# Patient Record
Sex: FEMALE | Race: WHITE | NOT HISPANIC OR LATINO | Employment: OTHER | URBAN - METROPOLITAN AREA
[De-identification: names, ages, dates, MRNs, and addresses within clinical notes are randomized per-mention and may not be internally consistent; named-entity substitution may affect disease eponyms.]

---

## 2022-12-20 ENCOUNTER — APPOINTMENT (OUTPATIENT)
Dept: RADIOLOGY | Facility: HOSPITAL | Age: 67
End: 2022-12-20
Attending: NURSE PRACTITIONER
Payer: COMMERCIAL

## 2022-12-20 ENCOUNTER — HOSPITAL ENCOUNTER (OUTPATIENT)
Facility: HOSPITAL | Age: 67
Setting detail: OBSERVATION
Discharge: HOME | End: 2022-12-21
Attending: EMERGENCY MEDICINE | Admitting: HOSPITALIST
Payer: COMMERCIAL

## 2022-12-20 ENCOUNTER — APPOINTMENT (OUTPATIENT)
Dept: CT IMAGING | Facility: HOSPITAL | Age: 67
End: 2022-12-20
Attending: EMERGENCY MEDICINE
Payer: COMMERCIAL

## 2022-12-20 DIAGNOSIS — S32.020A CLOSED COMPRESSION FRACTURE OF L2 LUMBAR VERTEBRA, INITIAL ENCOUNTER (H): ICD-10-CM
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
## 2022-12-20 DIAGNOSIS — K59.00 CONSTIPATION, UNSPECIFIED CONSTIPATION TYPE: Primary | ICD-10-CM

## 2022-12-20 PROBLEM — F51.01 PRIMARY INSOMNIA: Status: ACTIVE | Noted: 2021-06-09

## 2022-12-20 PROBLEM — F41.1 GENERALIZED ANXIETY DISORDER: Status: ACTIVE | Noted: 2021-06-09

## 2022-12-20 PROBLEM — M85.80 OSTEOPENIA: Status: ACTIVE | Noted: 2022-05-16

## 2022-12-20 PROBLEM — F33.42 RECURRENT MAJOR DEPRESSIVE DISORDER, IN FULL REMISSION (H): Status: ACTIVE | Noted: 2021-06-09

## 2022-12-20 PROBLEM — I10 ESSENTIAL HYPERTENSION: Status: ACTIVE | Noted: 2021-06-09

## 2022-12-20 PROBLEM — E53.9 VITAMIN B DEFICIENCY: Status: ACTIVE | Noted: 2021-06-09

## 2022-12-20 PROBLEM — E78.5 HYPERLIPIDEMIA: Status: ACTIVE | Noted: 2021-06-09

## 2022-12-20 LAB
ANION GAP SERPL CALCULATED.3IONS-SCNC: 15 MMOL/L (ref 7–15)
BASOPHILS # BLD AUTO: 0.1 10E3/UL (ref 0–0.2)
BASOPHILS NFR BLD AUTO: 1 %
BUN SERPL-MCNC: 10.4 MG/DL (ref 8–23)
CALCIUM SERPL-MCNC: 10.1 MG/DL (ref 8.8–10.2)
CHLORIDE SERPL-SCNC: 96 MMOL/L (ref 98–107)
CREAT SERPL-MCNC: 0.69 MG/DL (ref 0.51–0.95)
DEPRECATED HCO3 PLAS-SCNC: 23 MMOL/L (ref 22–29)
EOSINOPHIL # BLD AUTO: 0.1 10E3/UL (ref 0–0.7)
EOSINOPHIL NFR BLD AUTO: 2 %
ERYTHROCYTE [DISTWIDTH] IN BLOOD BY AUTOMATED COUNT: 12.6 % (ref 10–15)
GFR SERPL CREATININE-BSD FRML MDRD: >90 ML/MIN/1.73M2
GLUCOSE SERPL-MCNC: 90 MG/DL (ref 70–99)
HCT VFR BLD AUTO: 39.5 % (ref 35–47)
HGB BLD-MCNC: 13.4 G/DL (ref 11.7–15.7)
IMM GRANULOCYTES # BLD: 0.1 10E3/UL
IMM GRANULOCYTES NFR BLD: 1 %
LYMPHOCYTES # BLD AUTO: 2.2 10E3/UL (ref 0.8–5.3)
LYMPHOCYTES NFR BLD AUTO: 29 %
MCH RBC QN AUTO: 30.7 PG (ref 26.5–33)
MCHC RBC AUTO-ENTMCNC: 33.9 G/DL (ref 31.5–36.5)
MCV RBC AUTO: 91 FL (ref 78–100)
MONOCYTES # BLD AUTO: 0.6 10E3/UL (ref 0–1.3)
MONOCYTES NFR BLD AUTO: 8 %
NEUTROPHILS # BLD AUTO: 4.7 10E3/UL (ref 1.6–8.3)
NEUTROPHILS NFR BLD AUTO: 59 %
NRBC # BLD AUTO: 0 10E3/UL
NRBC BLD AUTO-RTO: 0 /100
PLATELET # BLD AUTO: 382 10E3/UL (ref 150–450)
POTASSIUM SERPL-SCNC: 3.8 MMOL/L (ref 3.4–5.3)
RBC # BLD AUTO: 4.36 10E6/UL (ref 3.8–5.2)
SARS-COV-2 RNA RESP QL NAA+PROBE: NEGATIVE
SODIUM SERPL-SCNC: 134 MMOL/L (ref 136–145)
WBC # BLD AUTO: 7.7 10E3/UL (ref 4–11)

## 2022-12-20 PROCEDURE — 250N000013 HC RX MED GY IP 250 OP 250 PS 637: Performed by: HOSPITALIST

## 2022-12-20 PROCEDURE — 99285 EMERGENCY DEPT VISIT HI MDM: CPT | Mod: 25

## 2022-12-20 PROCEDURE — 250N000013 HC RX MED GY IP 250 OP 250 PS 637

## 2022-12-20 PROCEDURE — 72131 CT LUMBAR SPINE W/O DYE: CPT

## 2022-12-20 PROCEDURE — 72100 X-RAY EXAM L-S SPINE 2/3 VWS: CPT

## 2022-12-20 PROCEDURE — 99219 PR INITIAL OBSERVATION CARE,LEVEL II: CPT | Performed by: HOSPITALIST

## 2022-12-20 PROCEDURE — 36415 COLL VENOUS BLD VENIPUNCTURE: CPT | Performed by: EMERGENCY MEDICINE

## 2022-12-20 PROCEDURE — U0003 INFECTIOUS AGENT DETECTION BY NUCLEIC ACID (DNA OR RNA); SEVERE ACUTE RESPIRATORY SYNDROME CORONAVIRUS 2 (SARS-COV-2) (CORONAVIRUS DISEASE [COVID-19]), AMPLIFIED PROBE TECHNIQUE, MAKING USE OF HIGH THROUGHPUT TECHNOLOGIES AS DESCRIBED BY CMS-2020-01-R: HCPCS | Performed by: EMERGENCY MEDICINE

## 2022-12-20 PROCEDURE — C9803 HOPD COVID-19 SPEC COLLECT: HCPCS

## 2022-12-20 PROCEDURE — 250N000011 HC RX IP 250 OP 636: Performed by: EMERGENCY MEDICINE

## 2022-12-20 PROCEDURE — 96375 TX/PRO/DX INJ NEW DRUG ADDON: CPT

## 2022-12-20 PROCEDURE — 85025 COMPLETE CBC W/AUTO DIFF WBC: CPT | Performed by: EMERGENCY MEDICINE

## 2022-12-20 PROCEDURE — 72192 CT PELVIS W/O DYE: CPT

## 2022-12-20 PROCEDURE — 96374 THER/PROPH/DIAG INJ IV PUSH: CPT

## 2022-12-20 PROCEDURE — G0378 HOSPITAL OBSERVATION PER HR: HCPCS

## 2022-12-20 PROCEDURE — 96376 TX/PRO/DX INJ SAME DRUG ADON: CPT

## 2022-12-20 PROCEDURE — 250N000013 HC RX MED GY IP 250 OP 250 PS 637: Performed by: INTERNAL MEDICINE

## 2022-12-20 PROCEDURE — 258N000003 HC RX IP 258 OP 636: Performed by: EMERGENCY MEDICINE

## 2022-12-20 PROCEDURE — 250N000011 HC RX IP 250 OP 636: Performed by: INTERNAL MEDICINE

## 2022-12-20 PROCEDURE — 82310 ASSAY OF CALCIUM: CPT | Performed by: EMERGENCY MEDICINE

## 2022-12-20 PROCEDURE — 250N000013 HC RX MED GY IP 250 OP 250 PS 637: Performed by: EMERGENCY MEDICINE

## 2022-12-20 PROCEDURE — 96361 HYDRATE IV INFUSION ADD-ON: CPT

## 2022-12-20 PROCEDURE — 258N000003 HC RX IP 258 OP 636: Performed by: INTERNAL MEDICINE

## 2022-12-20 RX ORDER — VENLAFAXINE HYDROCHLORIDE 150 MG/1
150 CAPSULE, EXTENDED RELEASE ORAL DAILY
Status: DISCONTINUED | OUTPATIENT
Start: 2022-12-20 | End: 2022-12-21 | Stop reason: HOSPADM

## 2022-12-20 RX ORDER — LANOLIN ALCOHOL/MO/W.PET/CERES
3 CREAM (GRAM) TOPICAL
Status: DISCONTINUED | OUTPATIENT
Start: 2022-12-20 | End: 2022-12-21 | Stop reason: HOSPADM

## 2022-12-20 RX ORDER — CHOLECALCIFEROL (VITAMIN D3) 125 MCG
10000 CAPSULE ORAL DAILY
COMMUNITY

## 2022-12-20 RX ORDER — NALOXONE HYDROCHLORIDE 0.4 MG/ML
0.2 INJECTION, SOLUTION INTRAMUSCULAR; INTRAVENOUS; SUBCUTANEOUS
Status: DISCONTINUED | OUTPATIENT
Start: 2022-12-20 | End: 2022-12-21 | Stop reason: HOSPADM

## 2022-12-20 RX ORDER — HYDROMORPHONE HYDROCHLORIDE 1 MG/ML
0.5 INJECTION, SOLUTION INTRAMUSCULAR; INTRAVENOUS; SUBCUTANEOUS EVERY 4 HOURS PRN
Status: DISCONTINUED | OUTPATIENT
Start: 2022-12-20 | End: 2022-12-21

## 2022-12-20 RX ORDER — LORAZEPAM 2 MG/ML
1 INJECTION INTRAMUSCULAR ONCE
Status: COMPLETED | OUTPATIENT
Start: 2022-12-20 | End: 2022-12-20

## 2022-12-20 RX ORDER — LIDOCAINE 4 G/G
1 PATCH TOPICAL
Status: DISCONTINUED | OUTPATIENT
Start: 2022-12-20 | End: 2022-12-21

## 2022-12-20 RX ORDER — MULTIVITAMIN,THERAPEUTIC
1 TABLET ORAL DAILY
COMMUNITY

## 2022-12-20 RX ORDER — VENLAFAXINE HYDROCHLORIDE 150 MG/1
150 TABLET, EXTENDED RELEASE ORAL DAILY
COMMUNITY

## 2022-12-20 RX ORDER — ONDANSETRON 2 MG/ML
4 INJECTION INTRAMUSCULAR; INTRAVENOUS EVERY 6 HOURS PRN
Status: DISCONTINUED | OUTPATIENT
Start: 2022-12-20 | End: 2022-12-21 | Stop reason: HOSPADM

## 2022-12-20 RX ORDER — ONDANSETRON 2 MG/ML
4 INJECTION INTRAMUSCULAR; INTRAVENOUS EVERY 30 MIN PRN
Status: DISCONTINUED | OUTPATIENT
Start: 2022-12-20 | End: 2022-12-20

## 2022-12-20 RX ORDER — HYDROXYZINE HYDROCHLORIDE 25 MG/1
25 TABLET, FILM COATED ORAL
Status: COMPLETED | OUTPATIENT
Start: 2022-12-20 | End: 2022-12-20

## 2022-12-20 RX ORDER — SODIUM CHLORIDE 9 MG/ML
INJECTION, SOLUTION INTRAVENOUS CONTINUOUS
Status: ACTIVE | OUTPATIENT
Start: 2022-12-20 | End: 2022-12-20

## 2022-12-20 RX ORDER — OXYCODONE HYDROCHLORIDE 5 MG/1
5 TABLET ORAL EVERY 4 HOURS PRN
Status: DISCONTINUED | OUTPATIENT
Start: 2022-12-20 | End: 2022-12-21 | Stop reason: HOSPADM

## 2022-12-20 RX ORDER — HYDROMORPHONE HYDROCHLORIDE 1 MG/ML
0.5 INJECTION, SOLUTION INTRAMUSCULAR; INTRAVENOUS; SUBCUTANEOUS
Status: DISCONTINUED | OUTPATIENT
Start: 2022-12-20 | End: 2022-12-20

## 2022-12-20 RX ORDER — AMOXICILLIN 250 MG
1 CAPSULE ORAL 2 TIMES DAILY PRN
Status: DISCONTINUED | OUTPATIENT
Start: 2022-12-20 | End: 2022-12-21 | Stop reason: HOSPADM

## 2022-12-20 RX ORDER — VITAMIN B COMPLEX
1000 TABLET ORAL DAILY
Status: DISCONTINUED | OUTPATIENT
Start: 2022-12-20 | End: 2022-12-21 | Stop reason: HOSPADM

## 2022-12-20 RX ORDER — ONDANSETRON 4 MG/1
4 TABLET, ORALLY DISINTEGRATING ORAL EVERY 6 HOURS PRN
Status: DISCONTINUED | OUTPATIENT
Start: 2022-12-20 | End: 2022-12-21 | Stop reason: HOSPADM

## 2022-12-20 RX ORDER — HYDROCHLOROTHIAZIDE 12.5 MG/1
12.5 CAPSULE ORAL DAILY
Status: ON HOLD | COMMUNITY
End: 2022-12-21

## 2022-12-20 RX ORDER — TRAZODONE HYDROCHLORIDE 50 MG/1
50-100 TABLET, FILM COATED ORAL
COMMUNITY

## 2022-12-20 RX ORDER — METHOCARBAMOL 500 MG/1
500 TABLET, FILM COATED ORAL EVERY 6 HOURS PRN
Status: DISCONTINUED | OUTPATIENT
Start: 2022-12-20 | End: 2022-12-21

## 2022-12-20 RX ORDER — KETOROLAC TROMETHAMINE 15 MG/ML
15 INJECTION, SOLUTION INTRAMUSCULAR; INTRAVENOUS ONCE
Status: COMPLETED | OUTPATIENT
Start: 2022-12-20 | End: 2022-12-20

## 2022-12-20 RX ORDER — ACETAMINOPHEN 325 MG/1
975 TABLET ORAL 3 TIMES DAILY
Status: DISCONTINUED | OUTPATIENT
Start: 2022-12-20 | End: 2022-12-21 | Stop reason: HOSPADM

## 2022-12-20 RX ORDER — FENTANYL CITRATE 50 UG/ML
50 INJECTION, SOLUTION INTRAMUSCULAR; INTRAVENOUS ONCE
Status: COMPLETED | OUTPATIENT
Start: 2022-12-20 | End: 2022-12-20

## 2022-12-20 RX ORDER — AMOXICILLIN 250 MG
2 CAPSULE ORAL 2 TIMES DAILY PRN
Status: DISCONTINUED | OUTPATIENT
Start: 2022-12-20 | End: 2022-12-21 | Stop reason: HOSPADM

## 2022-12-20 RX ORDER — METHOCARBAMOL 500 MG/1
500 TABLET, FILM COATED ORAL ONCE
Status: COMPLETED | OUTPATIENT
Start: 2022-12-20 | End: 2022-12-20

## 2022-12-20 RX ORDER — NALOXONE HYDROCHLORIDE 0.4 MG/ML
0.4 INJECTION, SOLUTION INTRAMUSCULAR; INTRAVENOUS; SUBCUTANEOUS
Status: DISCONTINUED | OUTPATIENT
Start: 2022-12-20 | End: 2022-12-21 | Stop reason: HOSPADM

## 2022-12-20 RX ORDER — HYDROMORPHONE HYDROCHLORIDE 1 MG/ML
0.5 INJECTION, SOLUTION INTRAMUSCULAR; INTRAVENOUS; SUBCUTANEOUS
Status: COMPLETED | OUTPATIENT
Start: 2022-12-20 | End: 2022-12-20

## 2022-12-20 RX ORDER — CHOLECALCIFEROL (VITAMIN D3) 125 MCG
10000 CAPSULE ORAL DAILY
Status: DISCONTINUED | OUTPATIENT
Start: 2022-12-20 | End: 2022-12-21 | Stop reason: HOSPADM

## 2022-12-20 RX ORDER — ACETAMINOPHEN 325 MG/1
325 TABLET ORAL EVERY 8 HOURS PRN
Status: DISCONTINUED | OUTPATIENT
Start: 2022-12-20 | End: 2022-12-20

## 2022-12-20 RX ORDER — CHLORAL HYDRATE 500 MG
1 CAPSULE ORAL DAILY
COMMUNITY

## 2022-12-20 RX ADMIN — OXYCODONE HYDROCHLORIDE 5 MG: 5 TABLET ORAL at 05:59

## 2022-12-20 RX ADMIN — VENLAFAXINE HYDROCHLORIDE 150 MG: 150 CAPSULE, EXTENDED RELEASE ORAL at 10:52

## 2022-12-20 RX ADMIN — ONDANSETRON 4 MG: 2 INJECTION INTRAMUSCULAR; INTRAVENOUS at 01:27

## 2022-12-20 RX ADMIN — HYDROMORPHONE HYDROCHLORIDE 0.5 MG: 1 INJECTION, SOLUTION INTRAMUSCULAR; INTRAVENOUS; SUBCUTANEOUS at 10:05

## 2022-12-20 RX ADMIN — SODIUM CHLORIDE 1000 ML: 9 INJECTION, SOLUTION INTRAVENOUS at 01:31

## 2022-12-20 RX ADMIN — Medication 10000 UNITS: at 10:52

## 2022-12-20 RX ADMIN — LIDOCAINE PATCH 4% 1 PATCH: 40 PATCH TOPICAL at 04:35

## 2022-12-20 RX ADMIN — Medication 1000 UNITS: at 10:06

## 2022-12-20 RX ADMIN — OXYCODONE HYDROCHLORIDE 5 MG: 5 TABLET ORAL at 13:30

## 2022-12-20 RX ADMIN — METHOCARBAMOL 500 MG: 500 TABLET, FILM COATED ORAL at 16:25

## 2022-12-20 RX ADMIN — METHOCARBAMOL 500 MG: 500 TABLET, FILM COATED ORAL at 09:02

## 2022-12-20 RX ADMIN — OXYCODONE HYDROCHLORIDE 5 MG: 5 TABLET ORAL at 19:11

## 2022-12-20 RX ADMIN — METHOCARBAMOL 500 MG: 500 TABLET, FILM COATED ORAL at 22:42

## 2022-12-20 RX ADMIN — ACETAMINOPHEN 975 MG: 325 TABLET ORAL at 09:02

## 2022-12-20 RX ADMIN — HYDROXYZINE HYDROCHLORIDE 25 MG: 25 TABLET, FILM COATED ORAL at 22:42

## 2022-12-20 RX ADMIN — ACETAMINOPHEN 975 MG: 325 TABLET ORAL at 13:30

## 2022-12-20 RX ADMIN — HYDROMORPHONE HYDROCHLORIDE 0.5 MG: 1 INJECTION, SOLUTION INTRAMUSCULAR; INTRAVENOUS; SUBCUTANEOUS at 01:29

## 2022-12-20 RX ADMIN — ACETAMINOPHEN 975 MG: 325 TABLET ORAL at 19:11

## 2022-12-20 RX ADMIN — HYDROMORPHONE HYDROCHLORIDE 0.5 MG: 1 INJECTION, SOLUTION INTRAMUSCULAR; INTRAVENOUS; SUBCUTANEOUS at 14:20

## 2022-12-20 RX ADMIN — KETOROLAC TROMETHAMINE 15 MG: 15 INJECTION, SOLUTION INTRAMUSCULAR; INTRAVENOUS at 01:40

## 2022-12-20 RX ADMIN — METHOCARBAMOL 500 MG: 500 TABLET, FILM COATED ORAL at 04:31

## 2022-12-20 RX ADMIN — LIDOCAINE PATCH 4% 1 PATCH: 40 PATCH TOPICAL at 19:11

## 2022-12-20 RX ADMIN — SODIUM CHLORIDE: 9 INJECTION, SOLUTION INTRAVENOUS at 10:05

## 2022-12-20 RX ADMIN — HYDROMORPHONE HYDROCHLORIDE 0.5 MG: 1 INJECTION, SOLUTION INTRAMUSCULAR; INTRAVENOUS; SUBCUTANEOUS at 20:30

## 2022-12-20 RX ADMIN — FENTANYL CITRATE 50 MCG: 50 INJECTION INTRAMUSCULAR; INTRAVENOUS at 02:14

## 2022-12-20 RX ADMIN — LORAZEPAM 1 MG: 2 INJECTION INTRAMUSCULAR; INTRAVENOUS at 01:40

## 2022-12-20 ASSESSMENT — ACTIVITIES OF DAILY LIVING (ADL)
ADLS_ACUITY_SCORE: 36
ADLS_ACUITY_SCORE: 35
ADLS_ACUITY_SCORE: 36
ADLS_ACUITY_SCORE: 35
ADLS_ACUITY_SCORE: 36
ADLS_ACUITY_SCORE: 35
DEPENDENT_IADLS:: INDEPENDENT
ADLS_ACUITY_SCORE: 35
ADLS_ACUITY_SCORE: 36

## 2022-12-20 ASSESSMENT — ENCOUNTER SYMPTOMS
BACK PAIN: 1
NUMBNESS: 0

## 2022-12-20 NOTE — ED PROVIDER NOTES
NAME: Saba Armstrong  AGE: 67 year old female  YOB: 1955  MRN: [id number]  EVALUATION DATE & TIME: 2022 12:39 AM    PCP: Corine Parsons    ED PROVIDER: Easton Gregory M.D.      Chief Complaint   Patient presents with     Fall     Back Pain     FINAL IMPRESSION:  1. Fall, initial encounter    2. Closed compression fracture of L2 lumbar vertebra, initial encounter (H)        MEDICAL DECISION MAKIN:06 AM I met with the patient, obtained history, performed an initial exam, and discussed options and plan for diagnostics and treatment here in the ED.   3:12 AM Results were communicated with the patient. Discussed observation plans. She was agreeable with the plan.   3:39 AM Care discussed with Dr. Cummings, hospitalist who accepts the patient for admission.    4:01 AM Spoke with Kiera Palma PA-C neurosurgery. They recommend standing x-rays to evaluate any further height loss.  MRI unlikely needed at this time as patient has not been incontinent since initial loss with impact. they will follow up with her in the morning.      Patient was clinically assessed and consented to treatment. After assessment, medical decision making and workup were discussed with the patient. The patient was agreeable to plan for testing, workup, and treatment.  Pertinent Labs & Imaging studies reviewed. (See chart for details)       Medical Decision Making    History:    Supplemental history from: Documented in HPI, if applicable    External Record(s) reviewed: Documented in HPI, if applicable.    Work Up:    Chart documentation includes differential considered and any EKGs or imaging interpreted by provider.    In additional to work up documented, I considered the following work up: See chart documentation, if applicable.    External consultation:    Discussion of management with another provider: See chart documentation, if applicable    Complicating factors:    Care impacted by chronic illness: Hyperlipidemia  and Hypertension    Care affected by social determinants of health: N/A    Disposition considerations: Admit.    Saba Armstrong is a 67 year old female who presents with fall and back pain.   Differential diagnosis includes but not limited to pelvic fracture, lumbar compression fracture, femoral neck fracture, paraspinal muscle spasm, lumbar radiculopathy.  Patient with low back pain and muscle spasms after fall onto her back.  Patient did not lose consciousness nor hit her head and she is not on blood thinners.  Patient appropriate and mainly having left lower back pain but is severe spasms of that left sacral and lumbar paraspinal muscles.  Patient presenting for lumbar compression fracture given the spasms.  Patient had IV placed and labs were sent which were unremarkable.  Patient requiring multiple medications to get spasms and pain under control.  Dilaudid was only minimally effective and followed by Ativan and Toradol did help more with the spasms but pain still quite severe contributing to more spasms.  Small dose of fentanyl was given and this was able to proceed with CT and she was able to tolerate laying flat.  CT scan was obtained that showed no pelvic fracture but did show an L2 compression fracture with 25% height loss anteriorly.  I informed patient of the fracture and we discussed plan of care.  Patient is visiting from 4 hours away and given her pain will likely require admission for pain control.  Patient I discussed this plan and she was in agreement.  I did page neurosurgery and discussed with him the small amount of urine when she fell initially though she has not been incontinent or had any saddle paresthesias since that initial impact and on discussion of CT with Kiera from neurosurgery unlikely cauda equina.  They would recommend upright x-rays to determine height loss and possible brace for support or possibly just for comfort.  After this discussion patient was discussed with hospitalist  for admission and continued pain control.    0 minutes of critical care time    MEDICATIONS GIVEN IN THE EMERGENCY:  Medications   Lidocaine (LIDOCARE) 4 % Patch 1 patch (1 patch Transdermal Patch/Med Applied 12/20/22 0435)     And   lidocaine patch in PLACE ( Transdermal Patch in Place 12/20/22 0443)   HYDROmorphone (PF) (DILAUDID) injection 0.5 mg (has no administration in time range)   HYDROmorphone (DILAUDID) injection 1 mg (has no administration in time range)   oxyCODONE (ROXICODONE) tablet 5 mg (has no administration in time range)   acetaminophen (TYLENOL) tablet 975 mg (has no administration in time range)   acetaminophen (TYLENOL) tablet 325 mg (has no administration in time range)   methocarbamol (ROBAXIN) tablet 500 mg (has no administration in time range)   naloxone (NARCAN) injection 0.2 mg (has no administration in time range)     Or   naloxone (NARCAN) injection 0.4 mg (has no administration in time range)     Or   naloxone (NARCAN) injection 0.2 mg (has no administration in time range)     Or   naloxone (NARCAN) injection 0.4 mg (has no administration in time range)   melatonin tablet 3 mg (has no administration in time range)   ondansetron (ZOFRAN ODT) ODT tab 4 mg (has no administration in time range)     Or   ondansetron (ZOFRAN) injection 4 mg (has no administration in time range)   senna-docusate (SENOKOT-S/PERICOLACE) 8.6-50 MG per tablet 1 tablet (has no administration in time range)     Or   senna-docusate (SENOKOT-S/PERICOLACE) 8.6-50 MG per tablet 2 tablet (has no administration in time range)   0.9% sodium chloride BOLUS (0 mLs Intravenous Stopped 12/20/22 0213)   HYDROmorphone (PF) (DILAUDID) injection 0.5 mg (0.5 mg Intravenous Given 12/20/22 0129)   LORazepam (ATIVAN) injection 1 mg (1 mg Intravenous Given 12/20/22 0140)   ketorolac (TORADOL) injection 15 mg (15 mg Intravenous Given 12/20/22 0140)   fentaNYL (PF) (SUBLIMAZE) injection 50 mcg (50 mcg Intravenous Given 12/20/22 021)  "  methocarbamol (ROBAXIN) tablet 500 mg (500 mg Oral Given 12/20/22 0431)       NEW PRESCRIPTIONS STARTED AT TODAY'S ER VISIT:  New Prescriptions    No medications on file          =================================================================    HPI    Patient information was obtained from: patient and Rn    Use of : N/A        Saba Armstrong is a 67 year old female with a past medical history of hypertension, hyperlipidemia, osteopenia, anxiety, recurrent depressive disorder, who presents a fall and lower back pain.    Patient brought in by EMS after sustaining a mechanical fall at 11pm as she walked out of the elevator and got \"bodychecked\" by another person opening a door from the corner causing her to fall onto her back immediately endorsing lower back pain. She did not sustain LOC. She did urinate on herself during the fall but no loss of bladder incontinence since then. She had to crawl on all on fours before being able to stand up. There is no radiation to her pain. She states she feels the pain spasming. Prior to arrival, she tried taking 2 extra strength Tylenol and applying ice to her lower back with no relief. No numbness or tingling in the groin area. No other injuries were sustained. No other medical complaints at this time.       REVIEW OF SYSTEMS   Review of Systems   Genitourinary:        Positive loss of bladder incontinence (resolved)   Musculoskeletal: Positive for back pain (lower).   Neurological: Negative for numbness.   All other systems reviewed and are negative.       PAST MEDICAL HISTORY:  History reviewed. No pertinent past medical history.    PAST SURGICAL HISTORY:  History reviewed. No pertinent surgical history.    CURRENT MEDICATIONS:      Current Facility-Administered Medications:      acetaminophen (TYLENOL) tablet 325 mg, 325 mg, Oral, Q8H PRN, Subhash Cummings MD     acetaminophen (TYLENOL) tablet 975 mg, 975 mg, Oral, TID, Subhash Cummings MD     HYDROmorphone " "(DILAUDID) injection 1 mg, 1 mg, Intravenous, Q3H PRN, Subhash Cummings MD     HYDROmorphone (PF) (DILAUDID) injection 0.5 mg, 0.5 mg, Intravenous, Q3H PRN, Subhash Cummings MD     Lidocaine (LIDOCARE) 4 % Patch 1 patch, 1 patch, Transdermal, Q24h, 1 patch at 12/20/22 0435 **AND** lidocaine patch in PLACE, , Transdermal, Q8H MICHAEL, Subhash Cummings MD     melatonin tablet 3 mg, 3 mg, Oral, At Bedtime PRN, Subhash Cummings MD     methocarbamol (ROBAXIN) tablet 500 mg, 500 mg, Oral, Q6H PRN, Subhash Cummings MD     naloxone (NARCAN) injection 0.2 mg, 0.2 mg, Intravenous, Q2 Min PRN **OR** naloxone (NARCAN) injection 0.4 mg, 0.4 mg, Intravenous, Q2 Min PRN **OR** naloxone (NARCAN) injection 0.2 mg, 0.2 mg, Intramuscular, Q2 Min PRN **OR** naloxone (NARCAN) injection 0.4 mg, 0.4 mg, Intramuscular, Q2 Min PRN, Subhash Cummings MD     ondansetron (ZOFRAN ODT) ODT tab 4 mg, 4 mg, Oral, Q6H PRN **OR** ondansetron (ZOFRAN) injection 4 mg, 4 mg, Intravenous, Q6H PRN, Subhash Cummings MD     oxyCODONE (ROXICODONE) tablet 5 mg, 5 mg, Oral, Q4H PRN, Subhash Cummings MD     senna-docusate (SENOKOT-S/PERICOLACE) 8.6-50 MG per tablet 1 tablet, 1 tablet, Oral, BID PRN **OR** senna-docusate (SENOKOT-S/PERICOLACE) 8.6-50 MG per tablet 2 tablet, 2 tablet, Oral, BID PRN, Subhash Cummings MD  No current outpatient medications on file.    ALLERGIES:  No Known Allergies    FAMILY HISTORY:  History reviewed. No pertinent family history.    SOCIAL HISTORY:   Social History     Socioeconomic History     Marital status:        PHYSICAL EXAM:    Vitals: /64   Pulse 81   Temp 97.9  F (36.6  C) (Oral)   Resp 18   Ht 1.575 m (5' 2\")   Wt 81.6 kg (180 lb)   SpO2 96%   BMI 32.92 kg/m     Physical Exam  Vitals and nursing note reviewed.   Constitutional:       General: She is not in acute distress.     Appearance: Normal appearance. She is normal weight. She is not ill-appearing or toxic-appearing.   HENT: "      Head: Normocephalic and atraumatic.   Eyes:      Extraocular Movements: Extraocular movements intact.      Pupils: Pupils are equal, round, and reactive to light.   Cardiovascular:      Rate and Rhythm: Normal rate and regular rhythm.      Pulses: Normal pulses.      Heart sounds: Normal heart sounds.      Comments: Bilateral lower extremity dorsal pedis pulses are intact.  Pulmonary:      Effort: Pulmonary effort is normal. No respiratory distress.      Breath sounds: Normal breath sounds.   Abdominal:      General: Abdomen is flat.      Tenderness: There is no abdominal tenderness. There is no right CVA tenderness, left CVA tenderness, guarding or rebound.      Hernia: No hernia is present.   Musculoskeletal:         General: Tenderness present.      Cervical back: Normal range of motion and neck supple. No tenderness.      Lumbar back: Swelling, signs of trauma, spasms, tenderness and bony tenderness present. No deformity. Negative right straight leg raise test and negative left straight leg raise test.        Back:    Skin:     General: Skin is warm and dry.      Capillary Refill: Capillary refill takes less than 2 seconds.      Coloration: Skin is not pale.   Neurological:      General: No focal deficit present.      Mental Status: She is alert and oriented to person, place, and time.      Sensory: No sensory deficit.      Motor: No weakness.      Coordination: Coordination normal.      Deep Tendon Reflexes: Reflexes normal.   Psychiatric:         Behavior: Behavior normal.           LAB:  All pertinent labs reviewed and interpreted.  Labs Ordered and Resulted from Time of ED Arrival to Time of ED Departure   BASIC METABOLIC PANEL - Abnormal       Result Value    Sodium 134 (*)     Potassium 3.8      Chloride 96 (*)     Carbon Dioxide (CO2) 23      Anion Gap 15      Urea Nitrogen 10.4      Creatinine 0.69      Calcium 10.1      Glucose 90      GFR Estimate >90     CBC WITH PLATELETS AND DIFFERENTIAL    WBC  Count 7.7      RBC Count 4.36      Hemoglobin 13.4      Hematocrit 39.5      MCV 91      MCH 30.7      MCHC 33.9      RDW 12.6      Platelet Count 382      % Neutrophils 59      % Lymphocytes 29      % Monocytes 8      % Eosinophils 2      % Basophils 1      % Immature Granulocytes 1      NRBCs per 100 WBC 0      Absolute Neutrophils 4.7      Absolute Lymphocytes 2.2      Absolute Monocytes 0.6      Absolute Eosinophils 0.1      Absolute Basophils 0.1      Absolute Immature Granulocytes 0.1      Absolute NRBCs 0.0     COVID-19 VIRUS (CORONAVIRUS) BY PCR       RADIOLOGY:  CT Lumbar Spine w/o Contrast   Final Result   IMPRESSION:   1.  Acute-appearing anterior compression deformity at L2 resulting in approximately 25% anterior height loss.   2.  Degenerative changes, as described.   3.  Diffuse bony demineralization, suggestive of osteoporosis.      CT Pelvis Bone wo Contrast   Final Result   IMPRESSION:   1.  No visible fracture.          PROCEDURES:   None      I, Mckayla Ramana, am serving as a scribe to document services personally performed by Dr. Easton Gregory  based on my observation and the provider's statements to me. I, Easton Gregory MD attest that Mckayla Boles is acting in a scribe capacity, has observed my performance of the services and has documented them in accordance with my direction.      Easton Gregory M.D.  Emergency Medicine  [facility] Emergency Department     Easton Gregory MD  12/20/22 0454

## 2022-12-20 NOTE — H&P
"[facility]    History and Physical - Hospitalist Service       Date of Admission:  12/20/2022    Assessment & Plan      Saba Armstrong is a 67 year old female admitted on 12/20/2022. She came to the ED for evaluation of back pain after a fall    1.  Acute anterior L2 compression fracture  CT showed 25% anterior height loss, diffuse bony demineralization  Supportive management  Pain control  Neurosurgery consult    2.  Hyponatremia, mild  Encourage oral hydration    3.  Essential hypertension  4.  Hyperlipidemia  5.  Depression, anxiety  Reconcile PTA medications     Diet: Combination Diet Low Saturated Fat Na <2400mg Diet    DVT Prophylaxis: Ambulate every shift  Echeverria Catheter: Not present  Central Lines: None  Cardiac Monitoring: None  Code Status: Full Code      Clinically Significant Risk Factors Present on Admission         # Hyponatremia: Lowest Na = 134 mmol/L in last 2 days, will monitor as appropriate               # Obesity: Estimated body mass index is 32.92 kg/m  as calculated from the following:    Height as of this encounter: 1.575 m (5' 2\").    Weight as of this encounter: 81.6 kg (180 lb).           Disposition Plan      Expected Discharge Date: 12/21/2022                The patient's care was discussed with the Patient.    Subhash Cummings MD  Hospitalist Service  [facility]  Securely message with the [employer] Web Console (learn more here)  Text page via [employer] Paging/Directory         ______________________________________________________________________    Chief Complaint   Back pain    History is obtained from the patient, electronic health record and emergency department physician    History of Present Illness   Saba Armstrong is a 67 year old female who came to the emergency department for evaluation of back pain after a fall.  Past medical history of hypertension, hyperlipidemia, depression, anxiety, insomnia.  Patient is visiting from [place] and " He's pretty stable. returned to the hotel after her grandson's hockey game.  She turned around the corner to get into the elevator when she was hit by a swinging door.  She was pushed against the wall and then fell on her right side.  She denies head trauma or loss of consciousness.  She had an onset of back pain and could not get up on her own.  Patient had episode of urine incontinence, however she states that she needed to go to the bathroom when she walked into the hotel.  She denies saddle anesthesia or radiation of pain down her legs.  She was assisted into her hotel room where she washed up but then could not get out of the bathroom on her own.  She was assisted into bed but then could not get comfortable in any position due to pain.  Patient had some ibuprofen and her daughter called the ambulance.  Patient required treatment with Toradol, lorazepam and fentanyl for better pain control before she could do a CT scan.      Review of Systems    The 10 point Review of Systems is negative other than noted in the HPI or here.     Past Medical History    I have reviewed this patient's medical history and updated it with pertinent information if needed.   History reviewed. No pertinent past medical history.    Past Surgical History   I have reviewed this patient's surgical history and updated it with pertinent information if needed.  History reviewed. No pertinent surgical history.    Social History   I have reviewed this patient's social history and updated it with pertinent information if needed.       Family History     Family history reviewed and noncontributory to current hospitalization    Prior to Admission Medications   None     Allergies   No Known Allergies    Physical Exam   Vital Signs: Temp: 97.9  F (36.6  C) Temp src: Oral BP: 97/55 Pulse: 83   Resp: 20 SpO2: 95 % O2 Device: Nasal cannula Oxygen Delivery: 2 LPM  Weight: 180 lbs 0 oz    Constitutional: awake and alert  Respiratory: no increased work of breathing and good  air exchange  Cardiovascular: regular rate and rhythm and normal S1 and S2  GI: normal bowel sounds and soft  Skin: no bruising or bleeding  Musculoskeletal: no lower extremity pitting edema present  Neurologic: Mental Status Exam:  Level of Alertness:   awake  Orientation:   person, place, time  Motor Exam:  moves all extremities well and symmetrically    Data   Data reviewed today: I reviewed all medications, new labs and imaging results over the last 24 hours. I personally reviewed no images or EKG's today.    Recent Labs   Lab 12/20/22  0127   WBC 7.7   HGB 13.4   MCV 91      *   POTASSIUM 3.8   CHLORIDE 96*   CO2 23   BUN 10.4   CR 0.69   ANIONGAP 15   ESTHER 10.1   GLC 90     Recent Results (from the past 24 hour(s))   CT Pelvis Bone wo Contrast    Narrative    EXAM: CT PELVIS BONE WO CONTRAST  LOCATION: [facility]  DATE/TIME: 12/20/2022 2:42 AM    INDICATION: fall onto sacrum w severe L sided sacral to hip pain.  COMPARISON: None.  TECHNIQUE: CT scan of the pelvis was performed without IV contrast. Multiplanar reformats were obtained. Dose reduction techniques were used.  CONTRAST: None.    FINDINGS:    PELVIS ORGANS: Visualized bowel normal caliber. No free fluid.    MUSCULOSKELETAL: Degenerative change osseous structures.  No visible fracture.      Impression    IMPRESSION:  1.  No visible fracture.   CT Lumbar Spine w/o Contrast    Narrative    EXAM: CT LUMBAR SPINE W/O CONTRAST  LOCATION: [facility]  DATE/TIME: 12/20/2022 2:44 AM    INDICATION: Traumatic injury. Fall onto sacrum with severe left-sided sacral pain radiating to the hip.  COMPARISON: None.  TECHNIQUE: Routine CT Lumbar Spine without IV contrast. Multiplanar reformats. Dose reduction techniques were used.     FINDINGS:  VERTEBRA: Left apex curvature of the lumbar spine. Grade 1 anterolisthesis of L4 on L5 and L5 on S1. Diffuse bony demineralization suggestive of osteoporosis. There  is a mild acute-appearing anterior compression deformity at L2, resulting in 25% anterior   height loss. No osseous retropulsion.    CANAL/FORAMINA: Multilevel degenerative changes of the lumbar spine without high-grade osseous spinal canal stenosis. Bilateral neural foraminal narrowing is greatest on the left at L5-S1 where there is at least moderate stenosis.    PARASPINAL: Mild prevertebral stranding at the level of L2, consistent with mild prevertebral hematoma. Scattered atherosclerotic calcification. See separately dictated pelvis CT.      Impression    IMPRESSION:  1.  Acute-appearing anterior compression deformity at L2 resulting in approximately 25% anterior height loss.  2.  Degenerative changes, as described.  3.  Diffuse bony demineralization, suggestive of osteoporosis.

## 2022-12-20 NOTE — PROGRESS NOTES
Neurosurgery plan of care    Paged by RN for a lumbar XR order for this patient.  Orders placed.  Patient did not show up on our neurosurgery consult list today    Per previous note from our team, could offer brace for pain control if there's an increase in compression of L2 on upright XR. Otherwise follow up in neurosurgery clinic as an outpatient.    Nathalie CARDENAS-C  [facility]  O. [phone]     no

## 2022-12-20 NOTE — PROGRESS NOTES
Late entry:   Called by Dr Gregory at 0400 to review CT lumbar - personally reviewed - reveals mild compression fracture L2 after a fall, no retropulsion, not on blood thinners and neuro intact without deficit. Patient endorses significant pain. Could offer back brace for pain control but not required for structural support. Would like upright XR to see if fracture collapses more and if it does, may require bracing. Please call with questions.     ZULEMA Disla-CNP  [facility] Neurosurgery  O: [phone]

## 2022-12-20 NOTE — CONSULTS
Neurosurgery consult note    Full note to follow-  66yo patient hx osteopenia on no anticoagulation was at a hockey game with her grandson and someone opened a door into her, pinning her back against the wall and she fell to the floor. No head injury or LOC. + bladder incontinence at time of injury, but not since. Patient is c/o severe lower back pain into R gluteal region. She initially went to her hotel room, but pain was bad and she came to [facility] ED for further evaluation. No radiation of pain into the legs, numbness, tingling, weakness. She denies neck or thoracic pain, arm pain, weakness or numbness. She has had no further b/b control issues nor saddle anesthesia.    Exam:  PERRL, EOMs intact, tongue midline, face symmetrical, negative pronator drift or dysmetria  She is full strength throughout both arms and both legs on exam.   Sensation is intact throughout both legs as well  Reflexes throughout lower extremities are 2+  No clonus or babinski  Point tenderness lumbar spine and R paraspinal musculature area.  No bruising    Assessment:  We reviewed her lumbar CT and upright XR. L2 compression fracture is mild. I showed her the pictures.  We would recommend pain control, avoidance of exacerbating activities including bending, twisting, and lifting >5lbs, and outpatient workup and treatment of any osteoporosis by her PCP.  She would like to pursue a lumbar support wrap for comfort while she heals. I will order one. She does not live in this area and I will place a neurosurgery referral for outpatient follow up. Typically we recommend 2-3wk follow up with new lumbar xr to be sure there is no additional compression while she heals. Did briefly mention vertebroplasty/kyphoplasty if fracture were to collapse more within the first 6 wks and she continued to experience severe pain.    Plan:  1. Lumbar support wrap to wear PRN pain  2. Continued pain control  3. Avoidance of lifting >5lbs, bending, twisting,  exacerbating activities  4. F/u with PCP for workup/treatment of osteoporosis  5. Ok to get up, advance activity, discharge from our perspective anytime  6. Would order MRI lumbar spine if developed worsening pain, leg pain, numbness, weakness, or additional bowel or bladder control episodes  6. Follow up with neurosurgery at outside group within 2-3wks with new upright lumbar XR  7. Call us if you dont hear anything and need assistance with coordinating care     Nathalie Cook FNP-C  [facility] Neurosurgery  O. [phone]      NEUROSURGERY CONSULTATION NOTE    Saba Armstrong   [address] [place] [id number]  67 year old female  Admission Date/Time: 12/20/2022 12:39 AM  [facility] Attending Physician: Jimmy Bobo DO    Neurosurgery was asked to see this patient by Jimmy Bobo DO for evaluation of L2 fracture.     PROBLEM LIST:  Principal Problem:    Closed compression fracture of L2 lumbar vertebra, initial encounter (H)       Neurosurgery Attending: The patient's clinical examination, laboratory data, and plan was discussed with Dr Plummer    CONSULTATION ASSESSMENT AND PLAN:    68yo patient hx osteopenia on no anticoagulation who was at a hockey game with her grandson and someone opened a door into her, pinning her back against the wall and she fell to the floor. No head injury or LOC. + bladder incontinence at time of injury, but not since. Patient is c/o severe lower back pain into R gluteal region.    We reviewed her lumbar CT and upright XR. L2 compression fracture is mild. I showed her the pictures. We would recommend pain control, avoidance of exacerbating activities including bending, twisting, and lifting >5lbs, and outpatient workup and treatment of any osteoporosis by her PCP.  She would like to pursue a lumbar support wrap for comfort while she heals. I will order one. She does not live in this area and I will place a  neurosurgery referral for outpatient follow up. Typically we recommend 2-3wk follow up with new lumbar xr to be sure there is no additional compression while she heals. Did briefly mention vertebroplasty/kyphoplasty if fracture were to collapse more within the first 6 wks and she continued to experience severe pain.    Plan:  1. Lumbar support wrap to wear PRN pain  2. Continued pain control  3. Avoidance of lifting >5lbs, bending, twisting, exacerbating activities  4. F/u with PCP for workup/treatment of osteoporosis  5. Ok to get up, advance activity, discharge from our perspective anytime  6. Would order MRI lumbar spine if developed worsening pain, leg pain, numbness, weakness, or additional bowel or bladder control episodes  6. Follow up with neurosurgery at outside group within 2-3wks with new upright lumbar XR  7. Call us if you dont hear anything and need assistance with coordinating care         HPI:    68yo patient hx osteopenia on no anticoagulation was at a hockey game with her grandson and someone opened a door into her, pinning her back against the wall and she fell to the floor. No head injury or LOC. + bladder incontinence at time of injury, but not since. Patient is c/o severe lower back pain into R gluteal region. She initially went to her hotel room, but pain was bad and she came to [facility] ED for further evaluation. No radiation of pain into the legs, numbness, tingling, weakness. She denies neck or thoracic pain, arm pain, weakness or numbness. She has had no further b/b control issues nor saddle anesthesia.    History reviewed. No pertinent past medical history.  History reviewed. No pertinent surgical history.    REVIEW OF SYSTEMS:   negative    MEDICATIONS:  No current outpatient medications on file.         ALLERGIES/SENSITIVITIES:     Allergies   Allergen Reactions     Hmg-Coa-R Inhibitors Other (See Comments)     Elevated liver enzymes     Lisinopril Cough       PERTINENT SOCIAL HISTORY:  "per below  Social History     Socioeconomic History     Marital status:      Spouse name: None     Number of children: None     Years of education: None     Highest education level: None   Tobacco Use     Smoking status: Former     Types: Cigarettes     Quit date: 1976     Years since quittin.0     Smokeless tobacco: Never         FAMILY HISTORY:  History reviewed. No pertinent family history.     PHYSICAL EXAM:   Constitutional: BP (!) 130/92 (BP Location: Right arm)   Pulse 86   Temp 98.2  F (36.8  C) (Oral)   Resp 19   Ht 5' 3\" (1.6 m)   Wt 184 lb 9.6 oz (83.7 kg)   SpO2 98%   BMI 32.70 kg/m       Mental Status: A & O in no acute distress.Speech is fluent.  Recent and remote memory are intact.  Attention span and concentration are normal. Lying in bed. Appears in pain, moving around the bed to try to get comfortable frequently     Cranial Nerves: CN1: grossly intact per patient recall. CN2: No funduscopic exam performed. CN3,4 & 6: Pupillary light response, lateral and vertical gaze normal.  No nystagmus.  Visual fields are full to confrontation. CN5: Intact to touch CN7: No facial weakness, smile, facial symmetry intact. CN8: Intact to spoken voice. CN9&10: Gag reflex, uvula midline, palate rises with phonation. CN11: Shoulder shrug 5/5 intact bilaterally. CN12: Tongue midline and moves freely from side to side.     Motor: No pronator drift of upper extremity. Normal bulk and tone all muscle groups of upper and lower extremities.    She is full strength throughout both arms and both legs on exam.   Sensation is intact throughout both legs as well    Coordination; finger to nose, rapid alternating movements smooth and rhythmic.    Gait deferred     Reflexes: knee/ ankle jerk 2+. No hoffmans/babinski/ clonus.    Point tenderness lumbar spine and R paraspinal musculature area.    IMAGING:  I personally reviewed all radiographic images     EXAM: XR LUMBAR SPINE 2/3 VIEWS  LOCATION: [facility]  DATE/TIME: 12/20/2022 3:56 PM     INDICATION: upright lumbar XR L2 fracture  COMPARISON: Lumbar spine CT 12/20/2022  TECHNIQUE: CR Lumbar Spine.                                                                      IMPRESSION: Mild L2 upper endplate fracture deformity with less than 10% height loss. No progression. Upper to mid lumbar levoconvex curvature of 13 degrees centered at L2-L3 is redemonstrated. Rotatory component. No new fractures. Mild degenerative disc   changes at L2-L3 through L5-S1. Moderate L1-L2 degenerative disc disease.] Mid and lower lumbar facet arthropathy. No pars defects.          (non critical care) I spent more than 45 minutes in this apt, examining the pt, reviewing the scans, reviewing notes from chart, discussing treatment options with risks and benefits and coordinating care. >50 % clinic time was spent in face to face counseling and coordinating care    Nathalie LAKAHNI  [facility] Neurosurgery  O. [phone]

## 2022-12-20 NOTE — ED NOTES
Bed: [facility]  Expected date:   Expected time:   Means of arrival: Ambulance  Comments:  67F back pain after fall, no thinners, no LOC

## 2022-12-20 NOTE — ED TRIAGE NOTES
Patient arrives EMS for fall and lower back pain. Patient was leaving a hotel when the door hit her and caused her to fall and land on her back. Patient was able to stand with the help of bystanders. Treatment prior to arrival included 2 extra strength tylenol and ice back to her lower back.      Triage Assessment     Row Name 12/20/22 0051       Triage Assessment (Adult)    Airway WDL WDL       Respiratory WDL    Respiratory WDL WDL       Skin Circulation/Temperature WDL    Skin Circulation/Temperature WDL WDL       Cardiac WDL    Cardiac WDL WDL       Peripheral/Neurovascular WDL    Peripheral Neurovascular WDL WDL       Cognitive/Neuro/Behavioral WDL    Cognitive/Neuro/Behavioral WDL WDL

## 2022-12-20 NOTE — PHARMACY-ADMISSION MEDICATION HISTORY
Pharmacy Note - Admission Medication History    Pertinent Provider Information: patient may take additional supplements at home but could not remember all names. Prescription medications have been verified per fill history     ______________________________________________________________________    Prior To Admission (PTA) med list completed and updated in EMR.       PTA Med List   Medication Sig Last Dose     cholecalciferol 25 MCG (1000 UT) TABS Take 1,000 Units by mouth daily 12/19/2022 at am     fish oil-omega-3 fatty acids 1000 MG capsule Take 1 g by mouth daily 12/19/2022 at am     hydrochlorothiazide (MICROZIDE) 12.5 MG capsule Take 12.5 mg by mouth daily 12/19/2022 at am     multivitamin, therapeutic (THERA-VIT) TABS tablet Take 1 tablet by mouth daily 12/19/2022 at am     traZODone (DESYREL) 50 MG tablet Take  mg by mouth nightly as needed for sleep Unknown     UNABLE TO FIND Take 1 tablet by mouth daily MEDICATION NAME: thistle seed 12/19/2022 at am     venlafaxine (EFFEXOR-ER) 150 MG 24 hr tablet Take 150 mg by mouth daily 12/19/2022 at am     vitamin A 3 MG (21128 UNITS) capsule Take 10,000 Units by mouth daily 12/19/2022 at am       Information source(s): Patient  Method of interview communication: in-person    Summary of Changes to PTA Med List  New: all entered new  Discontinued: none  Changed: none    Patient was asked about OTC/herbal products specifically.  PTA med list reflects this.    In the past week, patient estimated taking medication this percent of the time:  greater than 90%.    Allergies were reviewed, assessed, and updated with the patient.      Patient does not use any multi-dose medications prior to admission.    The information provided in this note is only as accurate as the sources available at the time of the update(s).    Thank you for the opportunity to participate in the care of this patient.    Jojo Pettit RPH  12/20/2022 9:05 AM

## 2022-12-21 ENCOUNTER — APPOINTMENT (OUTPATIENT)
Dept: OCCUPATIONAL THERAPY | Facility: HOSPITAL | Age: 67
End: 2022-12-21
Attending: INTERNAL MEDICINE
Payer: COMMERCIAL

## 2022-12-21 ENCOUNTER — DOCUMENTATION ONLY (OUTPATIENT)
Dept: ORTHOPEDICS | Facility: CLINIC | Age: 67
End: 2022-12-21

## 2022-12-21 VITALS
SYSTOLIC BLOOD PRESSURE: 109 MMHG | OXYGEN SATURATION: 94 % | HEIGHT: 63 IN | DIASTOLIC BLOOD PRESSURE: 66 MMHG | BODY MASS INDEX: 32.71 KG/M2 | WEIGHT: 184.6 LBS | HEART RATE: 92 BPM | RESPIRATION RATE: 20 BRPM | TEMPERATURE: 98.9 F

## 2022-12-21 LAB
ALBUMIN SERPL BCG-MCNC: 3.8 G/DL (ref 3.5–5.2)
ALP SERPL-CCNC: 132 U/L (ref 35–104)
ALT SERPL W P-5'-P-CCNC: 59 U/L (ref 10–35)
ANION GAP SERPL CALCULATED.3IONS-SCNC: 9 MMOL/L (ref 7–15)
AST SERPL W P-5'-P-CCNC: 58 U/L (ref 10–35)
BILIRUB SERPL-MCNC: 0.9 MG/DL
BUN SERPL-MCNC: 10 MG/DL (ref 8–23)
CALCIUM SERPL-MCNC: 9.1 MG/DL (ref 8.8–10.2)
CHLORIDE SERPL-SCNC: 103 MMOL/L (ref 98–107)
CREAT SERPL-MCNC: 0.66 MG/DL (ref 0.51–0.95)
DEPRECATED HCO3 PLAS-SCNC: 25 MMOL/L (ref 22–29)
ERYTHROCYTE [DISTWIDTH] IN BLOOD BY AUTOMATED COUNT: 12.6 % (ref 10–15)
GFR SERPL CREATININE-BSD FRML MDRD: >90 ML/MIN/1.73M2
GLUCOSE SERPL-MCNC: 129 MG/DL (ref 70–99)
HCT VFR BLD AUTO: 36.9 % (ref 35–47)
HGB BLD-MCNC: 12.4 G/DL (ref 11.7–15.7)
MAGNESIUM SERPL-MCNC: 2 MG/DL (ref 1.7–2.3)
MCH RBC QN AUTO: 31.2 PG (ref 26.5–33)
MCHC RBC AUTO-ENTMCNC: 33.6 G/DL (ref 31.5–36.5)
MCV RBC AUTO: 93 FL (ref 78–100)
PLATELET # BLD AUTO: 327 10E3/UL (ref 150–450)
POTASSIUM SERPL-SCNC: 4 MMOL/L (ref 3.4–5.3)
PROT SERPL-MCNC: 6.4 G/DL (ref 6.4–8.3)
RBC # BLD AUTO: 3.97 10E6/UL (ref 3.8–5.2)
SODIUM SERPL-SCNC: 137 MMOL/L (ref 136–145)
WBC # BLD AUTO: 6.2 10E3/UL (ref 4–11)

## 2022-12-21 PROCEDURE — 250N000013 HC RX MED GY IP 250 OP 250 PS 637: Performed by: HOSPITALIST

## 2022-12-21 PROCEDURE — 99217 PR OBSERVATION CARE DISCHARGE: CPT | Performed by: INTERNAL MEDICINE

## 2022-12-21 PROCEDURE — 96376 TX/PRO/DX INJ SAME DRUG ADON: CPT

## 2022-12-21 PROCEDURE — 250N000009 HC RX 250: Performed by: INTERNAL MEDICINE

## 2022-12-21 PROCEDURE — G0378 HOSPITAL OBSERVATION PER HR: HCPCS

## 2022-12-21 PROCEDURE — 36415 COLL VENOUS BLD VENIPUNCTURE: CPT | Performed by: INTERNAL MEDICINE

## 2022-12-21 PROCEDURE — 97535 SELF CARE MNGMENT TRAINING: CPT | Mod: GO

## 2022-12-21 PROCEDURE — 97165 OT EVAL LOW COMPLEX 30 MIN: CPT | Mod: GO

## 2022-12-21 PROCEDURE — 83735 ASSAY OF MAGNESIUM: CPT | Performed by: INTERNAL MEDICINE

## 2022-12-21 PROCEDURE — 250N000011 HC RX IP 250 OP 636: Performed by: INTERNAL MEDICINE

## 2022-12-21 PROCEDURE — 250N000013 HC RX MED GY IP 250 OP 250 PS 637: Performed by: INTERNAL MEDICINE

## 2022-12-21 PROCEDURE — 85027 COMPLETE CBC AUTOMATED: CPT | Performed by: INTERNAL MEDICINE

## 2022-12-21 PROCEDURE — 80053 COMPREHEN METABOLIC PANEL: CPT | Performed by: INTERNAL MEDICINE

## 2022-12-21 RX ORDER — LIDOCAINE 50 MG/G
OINTMENT TOPICAL 4 TIMES DAILY
Status: DISCONTINUED | OUTPATIENT
Start: 2022-12-21 | End: 2022-12-21 | Stop reason: HOSPADM

## 2022-12-21 RX ORDER — ACETAMINOPHEN 325 MG/1
975 TABLET ORAL 3 TIMES DAILY
Qty: 45 TABLET | Refills: 0 | Status: SHIPPED | OUTPATIENT
Start: 2022-12-21 | End: 2022-12-26

## 2022-12-21 RX ORDER — CALCITONIN SALMON 200 [IU]/.09ML
1 SPRAY, METERED NASAL DAILY
Status: DISCONTINUED | OUTPATIENT
Start: 2022-12-21 | End: 2022-12-21 | Stop reason: HOSPADM

## 2022-12-21 RX ORDER — OXYCODONE HYDROCHLORIDE 5 MG/1
5 TABLET ORAL EVERY 4 HOURS PRN
Qty: 15 TABLET | Refills: 0 | Status: SHIPPED | OUTPATIENT
Start: 2022-12-21

## 2022-12-21 RX ORDER — METHOCARBAMOL 500 MG/1
500 TABLET, FILM COATED ORAL 4 TIMES DAILY
Status: DISCONTINUED | OUTPATIENT
Start: 2022-12-21 | End: 2022-12-21 | Stop reason: HOSPADM

## 2022-12-21 RX ORDER — METHOCARBAMOL 500 MG/1
500 TABLET, FILM COATED ORAL 4 TIMES DAILY PRN
Qty: 20 TABLET | Refills: 0 | Status: SHIPPED | OUTPATIENT
Start: 2022-12-21

## 2022-12-21 RX ORDER — CALCITONIN SALMON 200 [IU]/.09ML
1 SPRAY, METERED NASAL DAILY
Qty: 1.3 ML | Refills: 0 | Status: SHIPPED | OUTPATIENT
Start: 2022-12-21 | End: 2023-01-04

## 2022-12-21 RX ORDER — AMOXICILLIN 250 MG
1 CAPSULE ORAL 2 TIMES DAILY PRN
COMMUNITY
Start: 2022-12-21

## 2022-12-21 RX ORDER — LIDOCAINE 50 MG/G
OINTMENT TOPICAL 4 TIMES DAILY PRN
Qty: 30 G | Refills: 0 | Status: SHIPPED | OUTPATIENT
Start: 2022-12-21

## 2022-12-21 RX ADMIN — METHOCARBAMOL 500 MG: 500 TABLET, FILM COATED ORAL at 13:01

## 2022-12-21 RX ADMIN — OXYCODONE HYDROCHLORIDE 5 MG: 5 TABLET ORAL at 13:01

## 2022-12-21 RX ADMIN — LIDOCAINE: 50 OINTMENT TOPICAL at 13:01

## 2022-12-21 RX ADMIN — LIDOCAINE: 50 OINTMENT TOPICAL at 09:18

## 2022-12-21 RX ADMIN — OXYCODONE HYDROCHLORIDE 5 MG: 5 TABLET ORAL at 04:45

## 2022-12-21 RX ADMIN — VENLAFAXINE HYDROCHLORIDE 150 MG: 150 CAPSULE, EXTENDED RELEASE ORAL at 09:10

## 2022-12-21 RX ADMIN — METHOCARBAMOL 500 MG: 500 TABLET, FILM COATED ORAL at 04:45

## 2022-12-21 RX ADMIN — METHOCARBAMOL 500 MG: 500 TABLET, FILM COATED ORAL at 09:10

## 2022-12-21 RX ADMIN — Medication 10000 UNITS: at 09:10

## 2022-12-21 RX ADMIN — OXYCODONE HYDROCHLORIDE 5 MG: 5 TABLET ORAL at 00:08

## 2022-12-21 RX ADMIN — OXYCODONE HYDROCHLORIDE 5 MG: 5 TABLET ORAL at 09:31

## 2022-12-21 RX ADMIN — ACETAMINOPHEN 975 MG: 325 TABLET ORAL at 13:01

## 2022-12-21 RX ADMIN — HYDROMORPHONE HYDROCHLORIDE 0.5 MG: 1 INJECTION, SOLUTION INTRAMUSCULAR; INTRAVENOUS; SUBCUTANEOUS at 02:56

## 2022-12-21 RX ADMIN — HYDROMORPHONE HYDROCHLORIDE 0.5 MG: 1 INJECTION, SOLUTION INTRAMUSCULAR; INTRAVENOUS; SUBCUTANEOUS at 07:03

## 2022-12-21 RX ADMIN — ACETAMINOPHEN 975 MG: 325 TABLET ORAL at 09:10

## 2022-12-21 RX ADMIN — Medication 1000 UNITS: at 09:10

## 2022-12-21 ASSESSMENT — ACTIVITIES OF DAILY LIVING (ADL)
ADLS_ACUITY_SCORE: 36

## 2022-12-21 NOTE — PROGRESS NOTES
S: Saba Armstrong was seen at the [facility], Room 413-01, for the Evaluation, Fit and Delivery of a Lumbar Corset Style Orthosis.      Dx: Mild L2 upper endplate fracture deformity with less than 10% height loss. No progression. Upper to mid lumbar levoconvex curvature of 13 degrees centered at L2-L3 is redemonstrated. Rotatory component. No new fractures. Mild degenerative disc changes at L2-L3 through L5-S1. Moderate L1-L2 degenerative disc disease.] Mid and lower lumbar facet arthropathy. No pars defects.    O: The patient s waist circumference was measured to be 43 inches.   An X-Large Slater Corset Style Orthosis was selected.  The rigid anterior panel was assembled and set to the patient s midline.  The compression handles were then adjusted to contour the posterior panel of the orthosis to provide a custom fit the orthosis to the patient.      A:  I explained to the patient the purpose and function of the brace as well as donning and doffing.  I also provided the patient with the written use and care instructions from the .    P: If there is a need to adjust the brace while the patient still in the hospital, the patient s nurse should contact the [facility].  Adjustments made outside of the hospital can be performed, at no charge, at any of the [facility].    G: This orthosis applies compression to the patient s soft tissues in the lumbosacral region of the spine.  Compression of the patient s soft tissues unloads the vertebrae, which in turn reduces pain in the spine.  The orthosis also increases medial-lateral and anterior-posterior stability of the back/spine.  The goal of this orthosis is to reduce pain and increase the comfort while the patient performs their ADLs.    Please contact the [facility] at [phone] if you have any questions.  Thank you, Cachorro    Electronically signed by Cachorro  Lopez, , TERRELLO

## 2022-12-21 NOTE — PROGRESS NOTES
Care Management Follow Up    Length of Stay (days): 0    Expected Discharge Date: 12/21/2022     Concerns to be Addressed: no discharge needs identified     Patient plan of care discussed at interdisciplinary rounds: Yes    Anticipated Discharge Disposition: TBD      Anticipated Discharge Services:  TBD  Anticipated Discharge DME:  TBD  Education Provided on the Discharge Plan:  Per Care Team   Patient/Family in Agreement with the Plan:  Yes    Referrals Placed by CM/SW:    Private pay costs discussed: Not applicable    Additional Information:  Chart reviewed.    Lives w/spouse in their home in [place]. Indep at baseline, works full time. No services, no DME. No apparent CM needs. Spouse to transport at discharge.    Therapy consults placed, awaiting recs.       CM will continue to follow plan of care, review recommendations, and assist with any discharge needs anticipated.         Renae Espino RN

## 2022-12-21 NOTE — PLAN OF CARE
"  Problem: Plan of Care - These are the overarching goals to be used throughout the patient stay.    Goal: Absence of Hospital-Acquired Illness or Injury  Intervention: Identify and Manage Fall Risk  Recent Flowsheet Documentation  Taken 12/20/2022 1853 by Ilda Kothari RN  Safety Promotion/Fall Prevention: activity supervised  Intervention: Prevent and Manage VTE (Venous Thromboembolism) Risk  Recent Flowsheet Documentation  Taken 12/20/2022 1853 by Ilda Kothari RN  VTE Prevention/Management: SCDs (sequential compression devices) off  Goal: Optimal Comfort and Wellbeing  Intervention: Monitor Pain and Promote Comfort  Recent Flowsheet Documentation  Taken 12/20/2022 1853 by Ilda Kothari RN  Pain Management Interventions:    medication (see MAR)    cold applied     Problem: Mobility Impairment  Goal: Optimal Mobility  Outcome: Progressing   Goal Outcome Evaluation:  Pt came up from ED reporting 8/10 pain in lumbar spine to hip.  She is having spasms that cause her to cry out every few moments.  Given PRN Oxycodone for pain.  She is able to walk to bathroom and participate in bed mobility.  Instructed on lumbar spine precautions.  No brace until tomorrow.  Pt using O2 PRN stating she \"can't take a deep breath when back spasms\" so writer instructed on breathing technique to promote full breaths.                          "

## 2022-12-21 NOTE — PLAN OF CARE
"  Problem: Plan of Care - These are the overarching goals to be used throughout the patient stay.    Goal: Plan of Care Review  Description: The Plan of Care Review/Shift note should be completed every shift.  The Outcome Evaluation is a brief statement about your assessment that the patient is improving, declining, or no change.  This information will be displayed automatically on your shift note.  Outcome: Adequate for Care Transition  Goal: Patient-Specific Goal (Individualized)  Description: You can add care plan individualizations to a care plan. Examples of Individualization might be:  \"Parent requests to be called daily at 9am for status\", \"I have a hard time hearing out of my right ear\", or \"Do not touch me to wake me up as it startles me\".  Outcome: Adequate for Care Transition  Goal: Absence of Hospital-Acquired Illness or Injury  Outcome: Adequate for Care Transition  Intervention: Identify and Manage Fall Risk  Recent Flowsheet Documentation  Taken 12/21/2022 0900 by Enoc Marsh RN  Safety Promotion/Fall Prevention: bed alarm on  Intervention: Prevent Skin Injury  Recent Flowsheet Documentation  Taken 12/21/2022 0900 by Enoc Marsh RN  Body Position: position changed independently  Intervention: Prevent and Manage VTE (Venous Thromboembolism) Risk  Recent Flowsheet Documentation  Taken 12/21/2022 0900 by Enoc Marsh RN  VTE Prevention/Management: SCDs (sequential compression devices) off  Goal: Optimal Comfort and Wellbeing  Outcome: Adequate for Care Transition  Intervention: Monitor Pain and Promote Comfort  Recent Flowsheet Documentation  Taken 12/21/2022 0900 by Enoc Marsh RN  Pain Management Interventions: medication (see MAR)  Goal: Readiness for Transition of Care  Outcome: Adequate for Care Transition   Goal Outcome Evaluation:    Pt discharge to home accompanied by  at about 1415. VSS, Patient stated pain level of 7/10 and pain management was effective. Discharge " instruction reviewed with patient, discharge medication sent with patient and verbalized understanding of follow up appointment withy neurosurgery, PCP.

## 2022-12-21 NOTE — PLAN OF CARE
Problem: Plan of Care - These are the overarching goals to be used throughout the patient stay.    Goal: Absence of Hospital-Acquired Illness or Injury  Intervention: Identify and Manage Fall Risk  Recent Flowsheet Documentation  Taken 12/20/2022 2200 by Ilda Kothari RN  Safety Promotion/Fall Prevention: activity supervised  Taken 12/20/2022 1853 by Ilda Kothari RN  Safety Promotion/Fall Prevention: activity supervised  Intervention: Prevent and Manage VTE (Venous Thromboembolism) Risk  Recent Flowsheet Documentation  Taken 12/20/2022 2200 by Ilda Kothari RN  VTE Prevention/Management: SCDs (sequential compression devices) off  Taken 12/20/2022 1853 by Ilda Kothari RN  VTE Prevention/Management: SCDs (sequential compression devices) off  Goal: Optimal Comfort and Wellbeing  Intervention: Monitor Pain and Promote Comfort  Recent Flowsheet Documentation  Taken 12/20/2022 2200 by Ilda Kothari RN  Pain Management Interventions:    distraction    cold applied  Taken 12/20/2022 1853 by Ilda Kothari RN  Pain Management Interventions:    medication (see MAR)    cold applied   Goal Outcome Evaluation:  Pt continues to have severe pain despite administration of opiates.  She developed intense itching and a rash after Lidocaine patch was placed on her back.  Lidocaine was discontinued.  Pt has scratched her back due to itching.  She has spasms that cause her to blurt out every few moments.  She has been able to walk to the bathroom.  PRN Atarax ordered for itching.  Ice applied for comfort.  Robaxin PRN for spasms.

## 2022-12-21 NOTE — DISCHARGE SUMMARY
[facility] MEDICINE  DISCHARGE SUMMARY     Primary Care Physician: Corine Parsons  Admission Date: 12/20/2022   Discharge Provider: Jimmy Bobo DO, DO Discharge Date: 12/21/2022   Diet:   Active Diet and Nourishment Order   Procedures     Combination Diet Regular Diet     Discharge Instruction - Regular Diet Adult     Diet       Code Status: Full Code   Activity: per neurosurgery discharge activity instructions        Condition at Discharge: Stable     REASON FOR PRESENTATION(See Admission Note for Details)   Refer to h&p    PRINCIPAL & ACTIVE DISCHARGE DIAGNOSES     Principal Problem:    Closed compression fracture of L2 lumbar vertebra, initial encounter (H)      PENDING LABS     Unresulted Labs Ordered in the Past 30 Days of this Admission     No orders found for last 31 day(s).            PROCEDURES ( this hospitalization only)          RECOMMENDATIONS TO OUTPATIENT PROVIDER FOR F/U VISIT     Follow-up Appointments     Follow Up Care      You have been referred to neurosurgery after discharge. Someone should   call you to coordinate care in your area  We typically recommend follow up within 2-3 wks with new lumbar Xrays to   recheck your fracture.  Please call our office if you do not hear anything    Nathalie CARDENAS-C  [facility] Neurosurgery  O. [phone]         Follow-up and recommended labs and tests       Follow up with primary care provider, Corine Parsons, within 7 days for   hospital follow- up.  No follow up labs or test are needed.                 DISPOSITION     Home    SUMMARY OF HOSPITAL COURSE:      Acute L2 compression fracture: pain controlled.  lumbar brace, calcitonin nasal spray x 14d, pain meds as prescribed.  Outpatient f/u 2 weeks as advised in neurosurgery consultation note.  Refer to neurosurgery consultation note for additional instructions and recommendations.    Discharge Medications with Med changes:     Current Discharge Medication  List      START taking these medications    Details   acetaminophen (TYLENOL) 325 MG tablet Take 3 tablets (975 mg) by mouth 3 times daily for 5 days  Qty: 45 tablet, Refills: 0    Associated Diagnoses: Closed compression fracture of L2 lumbar vertebra, initial encounter (H)      calcitonin, salmon, (MIACALCIN) 200 UNIT/ACT nasal spray Spray 1 spray into one nostril alternating nostrils daily for 14 days Alternate nostril each day.  Qty: 1.3 mL, Refills: 0    Associated Diagnoses: Closed compression fracture of L2 lumbar vertebra, initial encounter (H)      lidocaine (XYLOCAINE) 5 % external ointment Apply topically 4 times daily as needed for moderate pain (4-6)  Qty: 30 g, Refills: 0    Associated Diagnoses: Closed compression fracture of L2 lumbar vertebra, initial encounter (H)      methocarbamol (ROBAXIN) 500 MG tablet Take 1 tablet (500 mg) by mouth 4 times daily as needed for muscle spasms  Qty: 20 tablet, Refills: 0    Associated Diagnoses: Closed compression fracture of L2 lumbar vertebra, initial encounter (H)      oxyCODONE (ROXICODONE) 5 MG tablet Take 1 tablet (5 mg) by mouth every 4 hours as needed for moderate pain (4-6)  Qty: 15 tablet, Refills: 0    Associated Diagnoses: Closed compression fracture of L2 lumbar vertebra, initial encounter (H)      senna-docusate (SENOKOT-S/PERICOLACE) 8.6-50 MG tablet Take 1 tablet by mouth 2 times daily as needed for constipation    Associated Diagnoses: Constipation, unspecified constipation type         CONTINUE these medications which have NOT CHANGED    Details   cholecalciferol 25 MCG (1000 UT) TABS Take 1,000 Units by mouth daily      fish oil-omega-3 fatty acids 1000 MG capsule Take 1 g by mouth daily      multivitamin, therapeutic (THERA-VIT) TABS tablet Take 1 tablet by mouth daily      traZODone (DESYREL) 50 MG tablet Take  mg by mouth nightly as needed for sleep      venlafaxine (EFFEXOR-ER) 150 MG 24 hr tablet Take 150 mg by mouth daily       vitamin A 3 MG (43682 UNITS) capsule Take 10,000 Units by mouth daily         STOP taking these medications       hydrochlorothiazide (MICROZIDE) 12.5 MG capsule Comments:   Reason for Stopping:         UNABLE TO FIND Comments:   Reason for Stopping:                     Rationale for medication changes:              Consults       NEUROSURGERY IP CONSULT  CARE MANAGEMENT / SOCIAL WORK IP CONSULT  ORTHOSIS BRACE IP CONSULT  PHYSICAL THERAPY ADULT IP CONSULT  OCCUPATIONAL THERAPY ADULT IP CONSULT    Immunizations given this encounter     Most Recent Immunizations   Administered Date(s) Administered     COVID-19 Vaccine 12+ (Pfizer 2022) 05/16/2022     COVID-19 Vaccine 18+ (Moderna) 11/28/2021     COVID-19 Vaccine Bivalent Booster 12+ (Pfizer) 12/09/2022     Influenza Vaccine 65+ (Fluzone HD) 12/09/2022           Anticoagulation Information            SIGNIFICANT IMAGING FINDINGS     Results for orders placed or performed during the hospital encounter of 12/20/22   CT Lumbar Spine w/o Contrast    Impression    IMPRESSION:  1.  Acute-appearing anterior compression deformity at L2 resulting in approximately 25% anterior height loss.  2.  Degenerative changes, as described.  3.  Diffuse bony demineralization, suggestive of osteoporosis.   CT Pelvis Bone wo Contrast    Impression    IMPRESSION:  1.  No visible fracture.   XR Lumbar Spine 2/3 Views    Impression    IMPRESSION: Mild L2 upper endplate fracture deformity with less than 10% height loss. No progression. Upper to mid lumbar levoconvex curvature of 13 degrees centered at L2-L3 is redemonstrated. Rotatory component. No new fractures. Mild degenerative disc   changes at L2-L3 through L5-S1. Moderate L1-L2 degenerative disc disease.] Mid and lower lumbar facet arthropathy. No pars defects.       SIGNIFICANT LABORATORY FINDINGS         Discharge Orders        Neurosurgery Referral      When to call - Contact Surgeon Team    Call [phone] with the following  symptoms:    *Worsening pain not relieved by the pain prescription given  *Worsening or new onset of weakness, or numbness and tingling  *Loss or change in your ability to control bowel or bladder function  *Change in your ability to walk, talk, see or think  *If you did not have a bowel movement before leaving the hospital and your bowels have not moved within 48 hours of your discharge    !!!! IF YOU HAVE A SERIOUS OR THREATENING EMERGENCY, CALL 911 OR COME TO THE EMERGENCY ROOM.  IF YOUR CONDITIONS ALLOWS COME TO THE HOSPITAL WHERE YOUR SURGERY WAS PERFORMED.    QUESTIONS OR CONCERNS:   OUR OFFICE HOURS ARE FROM 9:00 A.M -4:30 P.M. MONDAY-FRIDAY.  AFTER OFFICE HOURS, CALLS ARE ANSWERED BY THE ON-CALL PROVIDER. PLEASE CALL WITH ROUTINE QUESTIONS DURING OFFICE HOURS. THE ON-CALL PROVIDER WILL NOT REFILL PRESCRIPTIONS.     Discharge Instruction - Breathing exercises    Perform breathing exercises using your Incentive Spirometer 10 times per hour while awake for 2 weeks.     Symptoms - Fever Management    A low grade fever can be expected after surgery.  Use acetaminophen (TYLENOL) as needed for fever management.  Contact your Surgeon Team if you have a fever greater than 101.5 F, chills, and/or night sweats.     Symptoms - Constipation management    Constipation (hard, dry bowel movements) is expected after surgery due to the combination of being less active, the anesthetic, and the opioid pain medication.  You can do the following to help reduce constipation:  ~  FLUIDS:  Drink clear liquids (water or Gatorade), or juice (apple/prune).  ~  DIET:  Eat a fiber rich diet.    ~  ACTIVITY:  Get up and move around several times a day.  Increase your activity as you are able.  MEDICATIONS:  Reduce the risk of constipation by starting medications before you are constipated.  You can take Miralax   (1 packet as directed) and/or a stool softener (Senokot 1-2 tablets 1-2 times a day).  If you already have constipation and  these medications are not working, you can get magnesium citrate and use as directed.  If you continue to have constipation you can try an over the counter suppository or enema.  Call your Surgeon Team if it has been greater than 3 days since your last bowel movement.     Follow Up Care    You have been referred to neurosurgery after discharge. Someone should call you to coordinate care in your area  We typically recommend follow up within 2-3 wks with new lumbar Xrays to recheck your fracture.  Please call our office if you do not hear anything    Nathalie RIVERAP-C  [facility] Neurosurgery  O. [phone]     Activity    *No lifting, pushing or pulling greater than 5-10 pounds (this is about a gallon of milk).  *No repetitive bending, twisting, or jarring activities  *No overhead work  *No aerobic or strenuous activity  *No activities with increased risk of falls  *You may move about your home as tolerated  *You may walk up and down stairs as tolerated      WALKING PROGRAM: As you can tolerate, walk daily-start with 5-10 minutes of continuous walking. This is in addition to the walking that you do as part of your daily activities. Increase the time that you walk by 5 minutes every couple of days. Do not exceed 30-45 minutes of continuous walking until seen in follow-up.   **Listen to your body, if you find that you are more painful or fatigued, you may need to proceed more slowly.    **Do not smoke or expose yourself to second hand smoke. Cigarette smoke can delay healing and cause complications.     DRIVING:  We recommend that you do not drive while taking medications for pain or muscle spasms. Always read and follow the advice on your prescription bottle. If you have questions, speak with your pharmacist.  We recommend that you do not drive while wearing a brace, as it could limit your range of motion.     When to call - Reasons to Call 911    Call 911 immediately if you experience sudden-onset chest  pain, arm weakness/numbness, slurred speech, or shortness of breath     When to call - Reach out to Urgent Care    If you are not able to reach your Surgeon Team and you need immediate care, go to the Orthopedic Walk-in Clinic or Urgent Care at your Surgeon's office.  Do NOT go to the Emergency Room unless you have shortness of breath, chest pain, or other signs of a medical emergency.     Reason for your hospital stay    Refer to h&p     Follow-up and recommended labs and tests     Follow up with primary care provider, Corine Parsons, within 7 days for hospital follow- up.  No follow up labs or test are needed.     Discharge Instruction - Regular Diet Adult    Return to your pre-surgery diet unless instructed otherwise     Diet    Follow this diet upon discharge: Orders Placed This Encounter      Combination Diet Regular Diet      Discharge Instruction - Regular Diet Adult       Examination   Physical Exam   Temp:  [97.7  F (36.5  C)-98.9  F (37.2  C)] 98.9  F (37.2  C)  Pulse:  [83-98] 92  Resp:  [19-20] 20  BP: (109-140)/(60-92) 109/66  SpO2:  [90 %-98 %] 94 %  Wt Readings from Last 1 Encounters:   12/20/22 83.7 kg (184 lb 9.6 oz)       gen nad  cv rrr  Lungs cta  Neuro intact    Jimmy Bobo DO, DO  [facility]    CC:Corine Parsons

## 2022-12-21 NOTE — PROGRESS NOTES
12/21/22 0915   Appointment Info   Signing Clinician's Name / Credentials (OT) Greer Lloyd OT OTD   Quick Adds   Quick Adds Certification   Living Environment   People in Home spouse;other (see comments)  (adult nephew)   Current Living Arrangements house   Home Accessibility stairs to enter home;stairs within home   Number of Stairs, Main Entrance 2   Living Environment Comments Able to live on main floor if needed, tub/shower with grab bars, may have shower chair, owns 4WW   Self-Care   Equipment Currently Used at Home none   Activity/Exercise/Self-Care Comment ind with all ADLs and IADLs, works full time as  from home   General Information   Onset of Illness/Injury or Date of Surgery 12/20/22   Referring Physician Jimmy Bobo,    Patient/Family Therapy Goal Statement (OT) To decrease pain   Additional Occupational Profile Info/Pertinent History of Current Problem 66yo patient hx osteopenia on no anticoagulation   Existing Precautions/Restrictions brace worn when out of bed;spinal  (LSO corset)   Left Lower Extremity (Weight-bearing Status) weight-bearing as tolerated (WBAT)   Right Lower Extremity (Weight-bearing Status) weight-bearing as tolerated (WBAT)   General Observations and Info Increased pain at start of session, LSO brace provided from orthotics during session, reporting decreased pain   Cognitive Status Examination   Orientation Status orientation to person, place and time   Visual Perception   Visual Impairment/Limitations WFL   Posture   Posture not impaired   Range of Motion Comprehensive   General Range of Motion bilateral upper extremity ROM WFL   Strength Comprehensive (MMT)   General Manual Muscle Testing (MMT) Assessment no strength deficits identified   Coordination   Upper Extremity Coordination No deficits were identified   Bed Mobility   Bed Mobility supine-sit   Supine-Sit [place] (Bed Mobility) contact guard   Assistive Device (Bed Mobility) bed rails    Transfers   Transfers sit-stand transfer   Sit-Stand Transfer   Sit-Stand [place] (Transfers) contact guard   Sit/Stand Transfer Comments No AD   Activities of Daily Living   BADL Assessment/Intervention lower body dressing   Lower Body Dressing Assessment/Training   Position (Lower Body Dressing) unsupported sitting   Comment, (Lower Body Dressing) increased pain   [place] Level (Lower Body Dressing) minimum assist (75% patient effort)   Clinical Impression   Criteria for Skilled Therapeutic Interventions Met (OT) Yes, treatment indicated   OT Diagnosis Decreased ind with ADLs and safety   Influenced by the following impairments L2 compression fracture   OT Problem List-Impairments impacting ADL pain   Planned Therapy Interventions (OT) ADL retraining;bed mobility training   Clinical Decision Making Complexity (OT) low complexity   Risk & Benefits of therapy have been explained evaluation/treatment results reviewed;care plan/treatment goals reviewed;patient   OT Total Evaluation Time   OT Eval, Low Complexity Minutes (67760) 10   Therapy Certification   Medical Diagnosis L2 compression fracture   Start of Care Date 12/20/22   Certification date from 12/21/22   Certification date to 01/21/23   OT Goals   Therapy Frequency (OT) One time eval and treatment   OT Predicted Duration/Target Date for Goal Attainment 12/21/22   OT Goals Toilet Transfer/Toileting;Lower Body Dressing;Bed Mobility   OT: Lower Body Dressing Supervision/stand-by assist;within precautions;including set-up/clothing retrieval;Goal Met;Completed   OT: Bed Mobility Supervision/stand-by assist;supine to/from sitting;rolling;within precautions;Goal Met;Completed   OT: Toilet Transfer/Toileting Modified independent;toilet transfer;cleaning and garment management;within precautions;Goal Met;Completed   Interventions   Interventions Quick Adds Self-Care/Home Management   Self-Care/Home Management   Self-Care/Home Mgmt/ADL, Compensatory, Meal  Prep Minutes (22994) 39   Symptoms Noted During/After Treatment (Meal Preparation/Planning Training) increased pain   Treatment Detail/Skilled Intervention Educated pt on spinal precautions related to ADL tasks, demos understanding. Instructed on log roll technique to decrease pain - pt wincing in pain at start of session d/t back spasms. Following instruction pt completed bed mobility SBA. Instructed on PLB techniques to assist with pain mgmt. Instructed pt on donning socks and pants from seated position - pt able to bring feet to knees to thread over feet within precautions. Fx mob to/from bathroom using 4WW since pt owns at home, min cueing for walker safety throughout. Instructed on proper toilet transfer and decreased twisting for pericares - min cueing for hand placement, completed SBA. Pt provided LSO corset by orthotics - reviewed donning/doffing instructions, pt reporting significant decrease in pain. Completed standing g/h at sink ~ 10 minutes, min cueing for precautions. Reviewed shower chair for safety with bathing, demos understanding. Returned to bed, completed bed mob mod (I) with min cueing. All questions answered and needs met, pt demos relief and reports she feels much better going home today.   OT Discharge Planning   OT Plan D/c OT   OT Discharge Recommendation (DC Rec) home with assist   OT Rationale for DC Rec Pt has great assist from spouse and adult nephew who live with her, mobilizing well and decreased pain with LSO brace, met all OT goals   OT Brief overview of current status SBA mobility, mod (I) ADLs and bed mob, decreased pain with LSO brace   Total Session Time   Timed Code Treatment Minutes 39   Total Session Time (sum of timed and untimed services) 49      [facility]  OUTPATIENT OCCUPATIONAL THERAPY  EVALUATION  PLAN OF TREATMENT FOR OUTPATIENT REHABILITATION  (COMPLETE FOR INITIAL CLAIMS ONLY)  Patient's Last Name, First Name, M.I.  Date of Birth:   1955  Saba Armstrong                             Provider's Name  [facility] Medical Record No.  [id number]                             Onset Date:  12/20/22   Start of Care Date:  12/20/22   Type:     ___PT   _X_OT   ___SLP Medical Diagnosis:  L2 compression fracture                    OT Diagnosis:  Decreased ind with ADLs and safety Visits from SOC:  1     See note for plan of treatment, functional goals and certification details    I CERTIFY THE NEED FOR THESE SERVICES FURNISHED UNDER        THIS PLAN OF TREATMENT AND WHILE UNDER MY CARE     (Physician co-signature of this document indicates review and certification of the therapy plan).

## 2022-12-21 NOTE — PLAN OF CARE
"  Problem: Plan of Care - These are the overarching goals to be used throughout the patient stay.    Goal: Plan of Care Review  Description: The Plan of Care Review/Shift note should be completed every shift.  The Outcome Evaluation is a brief statement about your assessment that the patient is improving, declining, or no change.  This information will be displayed automatically on your shift note.  Outcome: Progressing  Goal: Patient-Specific Goal (Individualized)  Description: You can add care plan individualizations to a care plan. Examples of Individualization might be:  \"Parent requests to be called daily at 9am for status\", \"I have a hard time hearing out of my right ear\", or \"Do not touch me to wake me up as it startles me\".  Outcome: Progressing  Goal: Absence of Hospital-Acquired Illness or Injury  Outcome: Progressing  Intervention: Identify and Manage Fall Risk  Recent Flowsheet Documentation  Taken 12/20/2022 1600 by Edgardo Keen RN  Safety Promotion/Fall Prevention: activity supervised  Intervention: Prevent Skin Injury  Recent Flowsheet Documentation  Taken 12/20/2022 1600 by Edgardo Keen RN  Body Position: position changed independently  Goal: Optimal Comfort and Wellbeing  Outcome: Progressing  Goal: Readiness for Transition of Care  Outcome: Progressing   Goal Outcome Evaluation:                  Pt is alert and oriented x4. Pt is med complaint. Pt's v/s is stable. Pt received pain med for back ache. Pt will transfer to unit P4. Report was called to the nurse on P4.       "

## 2022-12-21 NOTE — PLAN OF CARE
Problem: Plan of Care - These are the overarching goals to be used throughout the patient stay.    Goal: Optimal Comfort and Wellbeing  Outcome: Progressing  Intervention: Monitor Pain and Promote Comfort  Recent Flowsheet Documentation  Taken 12/21/2022 0008 by Aquiles Ann, RN  Pain Management Interventions: medication (see MAR)   Goal Outcome Evaluation:       Pain control with PRN Oxycodone and IV Dilaudid.

## 2022-12-21 NOTE — CARE PLAN
Pt complains of intense itching on back.  Note raised red rash over entire back above waistband.  Pt had a lidocaine patch placed tonight where rash started.  Patch was removed and house officer was paged.  Dr. Jenson called back and ordered discontinuation of lidocaine patch.

## 2022-12-21 NOTE — PROGRESS NOTES
PT: Per discussion with OT, pt mobilizing well and has no PT needs. We will complete orders and defer discharge recs to OT. Thank you!

## 2022-12-21 NOTE — CONSULTS
Care Management Initial Consult    General Information  Assessment completed with: Patient,    Type of CM/SW Visit: Initial Assessment    Primary Care Provider verified and updated as needed:     Readmission within the last 30 days: no previous admission in last 30 days      Reason for Consult: discharge planning  Advance Care Planning: Advance Care Planning Reviewed: verified with patient          Communication Assessment  Patient's communication style: spoken language (English or Bilingual)             Cognitive  Cognitive/Neuro/Behavioral: WDL                      Living Environment:   People in home: spouse  Андрей  Current living Arrangements: house      Able to return to prior arrangements: yes       Family/Social Support:  Care provided by: self  Provides care for: no one  Marital Status:   , Children  Андрей       Description of Support System: Supportive, Involved         Current Resources:   Patient receiving home care services: No     Community Resources: None  Equipment currently used at home: none  Supplies currently used at home: None    Employment/Financial:  Employment Status: employed full-time        Financial Concerns:             Lifestyle & Psychosocial Needs:  Social Determinants of Health     Tobacco Use: Medium Risk     Smoking Tobacco Use: Former     Smokeless Tobacco Use: Never     Passive Exposure: Not on file   Alcohol Use: Not on file   Financial Resource Strain: Not on file   Food Insecurity: Not on file   Transportation Needs: Not on file   Physical Activity: Not on file   Stress: Not on file   Social Connections: Not on file   Intimate Partner Violence: Not on file   Depression: Not on file   Housing Stability: Not on file       Functional Status:  Prior to admission patient needed assistance:   Dependent ADLs:: Independent, Ambulation-no assistive device  Dependent IADLs:: Independent       Mental Health Status:          Chemical Dependency Status:                 Values/Beliefs:  Spiritual, Cultural Beliefs, [employer] Practices, Values that affect care:                 Additional Information:  Lives w/spouse in their home in [place]. Indep at baseline, works full time. No services, no DME.    BLUM discussed.    No apparent CM needs.  Spouse to transport at discharge.    Coretta Underwood RN

## 2022-12-23 ENCOUNTER — PATIENT OUTREACH (OUTPATIENT)
Dept: CARE COORDINATION | Facility: CLINIC | Age: 67
End: 2022-12-23

## 2022-12-23 NOTE — PROGRESS NOTES
[facility] Contact  [facility]/Voicemail     Clinical Data: Transitional Care Management Outreach     Outreach attempted x 2.  Left message on patient's voicemail, providing [facility]'s 24/7 scheduling and nurse triage phone number [phone] ([phone]) for questions/concerns and/or to schedule an appt with an [facility] provider, if they do not have a PCP.      Plan:  [facility] will do no further outreaches at this time.       Ellen Kahn  Community Health Worker  [facility], [facility]  [phone]      *Connected Care Resource Team does NOT follow patient ongoing. Referrals are identified based on internal discharge reports and the outreach is to ensure patient has an understanding of their discharge instructions.  
11-Nov-2020 15:00

## 2023-02-12 ENCOUNTER — HEALTH MAINTENANCE LETTER (OUTPATIENT)
Age: 68
End: 2023-02-12

## 2024-03-10 ENCOUNTER — HEALTH MAINTENANCE LETTER (OUTPATIENT)
Age: 69
End: 2024-03-10

## 2025-02-16 ENCOUNTER — HEALTH MAINTENANCE LETTER (OUTPATIENT)
Age: 70
End: 2025-02-16

## 2025-03-16 ENCOUNTER — HEALTH MAINTENANCE LETTER (OUTPATIENT)
Age: 70
End: 2025-03-16